# Patient Record
Sex: FEMALE | Race: WHITE | NOT HISPANIC OR LATINO | URBAN - METROPOLITAN AREA
[De-identification: names, ages, dates, MRNs, and addresses within clinical notes are randomized per-mention and may not be internally consistent; named-entity substitution may affect disease eponyms.]

---

## 2022-05-04 ENCOUNTER — WEB ENCOUNTER (OUTPATIENT)
Dept: URBAN - METROPOLITAN AREA CLINIC 70 | Facility: CLINIC | Age: 37
End: 2022-05-04

## 2022-05-04 ENCOUNTER — LAB OUTSIDE AN ENCOUNTER (OUTPATIENT)
Dept: URBAN - METROPOLITAN AREA CLINIC 70 | Facility: CLINIC | Age: 37
End: 2022-05-04

## 2022-05-04 ENCOUNTER — OFFICE VISIT (OUTPATIENT)
Dept: URBAN - METROPOLITAN AREA CLINIC 70 | Facility: CLINIC | Age: 37
End: 2022-05-04
Payer: COMMERCIAL

## 2022-05-04 ENCOUNTER — DASHBOARD ENCOUNTERS (OUTPATIENT)
Age: 37
End: 2022-05-04

## 2022-05-04 VITALS
BODY MASS INDEX: 36.31 KG/M2 | SYSTOLIC BLOOD PRESSURE: 116 MMHG | DIASTOLIC BLOOD PRESSURE: 60 MMHG | WEIGHT: 225.9 LBS | TEMPERATURE: 97.5 F | HEIGHT: 66 IN | HEART RATE: 70 BPM

## 2022-05-04 DIAGNOSIS — R10.9 ABDOMINAL PAIN, UNSPECIFIED SITE: ICD-10-CM

## 2022-05-04 DIAGNOSIS — R19.7 DIARRHEA, UNSPECIFIED TYPE: ICD-10-CM

## 2022-05-04 DIAGNOSIS — A04.9 BACTERIAL INTESTINAL INFECTION, UNSPECIFIED: ICD-10-CM

## 2022-05-04 DIAGNOSIS — R13.19 ESOPHAGEAL DYSPHAGIA: ICD-10-CM

## 2022-05-04 DIAGNOSIS — K21.9 GASTROESOPHAGEAL REFLUX DISEASE, UNSPECIFIED WHETHER ESOPHAGITIS PRESENT: ICD-10-CM

## 2022-05-04 PROBLEM — 235595009: Status: ACTIVE | Noted: 2022-05-04

## 2022-05-04 PROCEDURE — 99204 OFFICE O/P NEW MOD 45 MIN: CPT | Performed by: NURSE PRACTITIONER

## 2022-05-04 RX ORDER — FEXOFENADINE HYDROCHLORIDE 60 MG/1
1 TABLET TABLET, FILM COATED ORAL TWICE A DAY
Qty: 60 | Status: ACTIVE | COMMUNITY
Start: 2022-05-04 | End: 2022-06-03

## 2022-05-04 RX ORDER — FAMOTIDINE 20 MG/1
1 TABLET AT BEDTIME AS NEEDED TABLET, FILM COATED ORAL ONCE A DAY
Qty: 30 | Status: ACTIVE | COMMUNITY
Start: 2022-05-04

## 2022-05-04 RX ORDER — OMEPRAZOLE 40 MG/1
1 CAPSULE 30 MINUTES BEFORE MORNING MEAL CAPSULE, DELAYED RELEASE ORAL ONCE A DAY
Qty: 90 | Refills: 3 | OUTPATIENT
Start: 2022-05-04

## 2022-05-04 NOTE — HPI-TODAY'S VISIT:
Patient presents today for evaluation of GI issues. She reports chronic constipation with BM x 1 every 2 days with frequent hard stools requiring staining with an acute change in bowel habits ~ 3 weeks ago with now having diarrhea with BM x 3-4/day with loose non-bloody stool. No prior antibiotics, travel, or ill contacts. Also c/o intermittent RUQ abdominal pain x 1-2 months that last for minutes to hours before improving, along with abdominal cramping. Abdominal pain improves after having a BM. Has frequent heartburn with episode x 3 of intermittent dysphagia with solids only over the last couple of months. Is currently taking Pepcid with partial improvement. Takes Diclofenac a couple times a week for years for knee pain. No prior EGD or colon. No Fhx of colon cancer but mother with IBD (UC). Denies wt loss, CP, SOB, N/V, odynophagia, or signs of GI bleeding.